# Patient Record
Sex: FEMALE | Race: ASIAN | NOT HISPANIC OR LATINO | Employment: FULL TIME | URBAN - METROPOLITAN AREA
[De-identification: names, ages, dates, MRNs, and addresses within clinical notes are randomized per-mention and may not be internally consistent; named-entity substitution may affect disease eponyms.]

---

## 2020-02-17 ENCOUNTER — HOSPITAL ENCOUNTER (EMERGENCY)
Facility: HOSPITAL | Age: 45
Discharge: HOME | End: 2020-02-17
Attending: EMERGENCY MEDICINE
Payer: MEDICAID

## 2020-02-17 VITALS
RESPIRATION RATE: 18 BRPM | SYSTOLIC BLOOD PRESSURE: 157 MMHG | WEIGHT: 108 LBS | HEIGHT: 61 IN | TEMPERATURE: 98 F | DIASTOLIC BLOOD PRESSURE: 71 MMHG | BODY MASS INDEX: 20.39 KG/M2 | HEART RATE: 66 BPM | OXYGEN SATURATION: 98 %

## 2020-02-17 DIAGNOSIS — L72.9 SCALP CYST: Primary | ICD-10-CM

## 2020-02-17 PROCEDURE — 99281 EMR DPT VST MAYX REQ PHY/QHP: CPT | Mod: ER

## 2020-02-17 NOTE — DISCHARGE INSTRUCTIONS
Follow up with a primary care provider.  For worsening symptoms, chest pain, shortness of breath, increased abdominal pain, high grade fever, stroke or stroke like symptoms, immediately go to the nearest Emergency Room or call 911 as soon as possible.

## 2020-02-17 NOTE — ED PROVIDER NOTES
"Encounter Date: 2/17/2020       History     Chief Complaint   Patient presents with    Skin Check     Pt c/o "pimple" type area to scalp, states area "popped" and had some bleeding.  Pt states here to have area checked due to feeling some "fluid" to area.      Patient is a 44 year old female who presents with mass to scalp for 20 years. She states three days ago her finger nail scratched the area and it drained a little bit. She reports seeing a doctor two years ago who told her "I could have surgery if I wanted but I didn't need to". She is unsure what they told her the mass was. She denied any pain to the area. Any redness or warmth. She denied fever. She states there has been no drainage in three days she just wanted to get it checked out. She does not have a primary care provider.    The history is provided by the patient. The history is limited by a language barrier. A  was used.     Review of patient's allergies indicates:  No Known Allergies  History reviewed. No pertinent past medical history.  Past Surgical History:   Procedure Laterality Date    DILATION AND CURETTAGE OF UTERUS       History reviewed. No pertinent family history.  Social History     Tobacco Use    Smoking status: Never Smoker   Substance Use Topics    Alcohol use: No    Drug use: No     Review of Systems   Constitutional: Negative for fever.   Respiratory: Negative for shortness of breath.    Genitourinary: Negative for flank pain.   Musculoskeletal: Negative for gait problem.   Skin: Negative for color change, rash and wound.        + mass   Neurological: Negative for weakness and headaches.   Psychiatric/Behavioral: Negative for confusion.       Physical Exam     Initial Vitals [02/17/20 1553]   BP Pulse Resp Temp SpO2   (!) 157/71 66 18 98.1 °F (36.7 °C) 98 %      MAP       --         Physical Exam    Nursing note and vitals reviewed.  Constitutional: She appears well-developed and well-nourished. She is " cooperative.  Non-toxic appearance. She does not have a sickly appearance.   HENT:   Head: Normocephalic and atraumatic.       Right Ear: External ear normal.   Left Ear: External ear normal.   Nose: Nose normal.   Mouth/Throat: Uvula is midline.   3 x 3 cm, non-tender, fluctuant mass noted to scalp. No erythema. No warmth.    Eyes: Conjunctivae and lids are normal.   Neck: Normal range of motion and full passive range of motion without pain. Neck supple.   Cardiovascular: Normal rate, regular rhythm and normal heart sounds. Exam reveals no gallop and no friction rub.    No murmur heard.  Pulmonary/Chest: Breath sounds normal. She has no wheezes. She has no rhonchi. She has no rales.   Abdominal: Normal appearance.   Neurological: She is alert.   Skin: Skin is warm, dry and intact. No rash noted.         ED Course   Procedures  Labs Reviewed - No data to display       Imaging Results    None          Medical Decision Making:   History:   Old Medical Records: I decided to obtain old medical records.       APC / Resident Notes:   Urgent evaluation of a 44-year-old female who presents with scalp cyst.  It has been present for 20 years.  She reports 3 days ago she accidentally scratched the area causing some drainage of fluid.  She denies any redness or tenderness to the area.  We discussed this is likely a cyst due to it being there for over 20 years.  There is no sign of cellulitis at this time.  I doubt abscess.  Discussed with her that the only definite treatment is excision of the cyst capsule.  An I and D here with only temporarily eliminate the fluid collection.  There is no indication for drainage. Recommend she follow up for further evaluation.  She voices understanding.                              Clinical Impression:       ICD-10-CM ICD-9-CM   1. Scalp cyst L72.9 706.2                             Candida Johnston PA-C  02/17/20 1905